# Patient Record
(demographics unavailable — no encounter records)

---

## 2024-11-14 NOTE — DISCUSSION/SUMMARY
[Full Activity without restrictions including Physical Education & Athletics] : Full Activity without restrictions including Physical Education & Athletics [] : The components of the vaccine(s) to be administered today are listed in the plan of care. The disease(s) for which the vaccine(s) are intended to prevent and the risks have been discussed with the caretaker.  The risks are also included in the appropriate vaccination information statements which have been provided to the patient's caregiver.  The caregiver has given consent to vaccinate. [FreeTextEntry1] : Adrian is a 8 y/o M with no significant PMH here for annual WCC. MOC concerned regarding patient's ability to pay attention at home and states that this year, teachers have noticed this behavior as well. Discussed difference between normal childhood behavior vs. ADHD behaviors with mother. Will give mother Farner ADHD screens for home and school, set up appointment with Dr. Burden for behavioral evaluation. No other parental concerns. Patient well appearing, exam non-focal. MOC consents to flu vaccine. Can otherwise RTC in one year for following WCC.  #Attention Difficulties - Farner screens given to MOC - Will have office set up appointment with Dr. Burden for behavioral evaluation  #Health Maintenance - Flu vaccine given - Continue balanced diet with all food groups. Brush teeth twice a day with toothbrush. Recommend visit to dentist. Help child to maintain consistent daily routines and sleep schedule. School discussed. Ensure home is safe. Teach child about personal safety. Use consistent, positive discipline. Limit screen time to no more than 2 hours per day. Encourage physical activity. Child needs to ride in a belt-positioning booster seat until  4 feet 9 inches has been reached and are between 8 and 12 years of age. - SDOH screened and scored

## 2024-11-14 NOTE — PHYSICAL EXAM
wheezes [Alert] : alert [No Acute Distress] : no acute distress [Normocephalic] : normocephalic [Conjunctivae with no discharge] : conjunctivae with no discharge [PERRL] : PERRL [EOMI Bilateral] : EOMI bilateral [Auricles Well Formed] : auricles well formed [Clear Tympanic membranes with present light reflex and bony landmarks] : clear tympanic membranes with present light reflex and bony landmarks [No Discharge] : no discharge [Nares Patent] : nares patent [Pink Nasal Mucosa] : pink nasal mucosa [Palate Intact] : palate intact [Nonerythematous Oropharynx] : nonerythematous oropharynx [Supple, full passive range of motion] : supple, full passive range of motion [No Palpable Masses] : no palpable masses [Symmetric Chest Rise] : symmetric chest rise [Clear to Auscultation Bilaterally] : clear to auscultation bilaterally [Regular Rate and Rhythm] : regular rate and rhythm [Normal S1, S2 present] : normal S1, S2 present [No Murmurs] : no murmurs [Soft] : soft [NonTender] : non tender [Non Distended] : non distended [Normoactive Bowel Sounds] : normoactive bowel sounds [No Hepatomegaly] : no hepatomegaly [No Splenomegaly] : no splenomegaly [[place]: _____] : Maldonado [unfilled] [Testicles Descended Bilaterally] : testicles descended bilaterally [Patent] : patent [No fissures] : no fissures [No Abnormal Lymph Nodes Palpated] : no abnormal lymph nodes palpated [No Gait Asymmetry] : no gait asymmetry [No pain or deformities with palpation of bone, muscles, joints] : no pain or deformities with palpation of bone, muscles, joints [Normal Muscle Tone] : normal muscle tone [Straight] : straight [Cranial Nerves Grossly Intact] : cranial nerves grossly intact [No Rash or Lesions] : no rash or lesions

## 2024-11-14 NOTE — RISK ASSESSMENT
[No family history of SCA predisposing conditions] : No family history of SCA predisposing conditions [Has anyone in your immediate family (parents, grandparents, siblings) or other more distant relatives (aunts, uncles, cousins)  of heart] : Has anyone in your immediate family (parents, grandparents, siblings) or other more distant relatives (aunts, uncles, cousins)  of heart problems or had an unexpected sudden death before age 50 (This would include unexpected drownings, unexplained car accidents in which the relative was driving or sudden infant death syndrome.)? No [Are you related to anyone with hypertrophic cardiomyopathy or hypertrophic obstructive cardiomyopathy, Marfan syndrome, arrhythmogenic] : Are you related to anyone with hypertrophic cardiomyopathy or hypertrophic obstructive cardiomyopathy, Marfan syndrome, arrhythmogenic right ventricular cardiomyopathy, long QT syndrome, short QT syndrome, Brugada syndrome or catecholaminergic polymorphic ventricular tachycardia, or anyone younger than 50 years with a pacemaker or implantable defibrillator? No

## 2024-11-14 NOTE — HISTORY OF PRESENT ILLNESS
[Mother] : mother [whole] : whole milk [Fruit] : fruit [Vegetables] : vegetables [Meat] : meat [Grains] : grains [Eggs] : eggs [Dairy] : dairy [Eats meals with family] : eats meals with family [Normal] : Normal [Brushing teeth twice/d] : brushing teeth twice per day [Yes] : Patient goes to dentist yearly [Playtime (60 min/d)] : playtime 60 min a day [Appropiate parent-child-sibling interaction] : appropriate parent-child-sibling interaction [Has Friends] : has friends [Grade ___] : Grade [unfilled] [No] : No cigarette smoke exposure [Appropriately restrained in motor vehicle] : appropriately restrained in motor vehicle [Supervised outdoor play] : supervised outdoor play [Parent knows child's friends] : parent knows child's friends [Up to date] : Up to date [NO] : No [Exposure to electronic nicotine delivery system] : No exposure to electronic nicotine delivery system [FreeTextEntry7] : No significant interval history [de-identified] : MOC concerned regarding patient's attention. States that she had concern last year about inability to sit still but teachers at that time were not concerned. Says that this year, his fidgeting has worsened, he has a lot of energy, and teachers have also commented on it at recent parent-teacher conference after MOC brought up the subect. No other parental concerns. [FreeTextEntry9] : +plays soccer, considering starting karate this winter [de-identified] : See parental concern above [de-identified] : [facility] vaccine

## 2025-02-05 NOTE — REVIEW OF SYSTEMS
[Fever] : fever [Nasal Discharge] : nasal discharge [Nasal Congestion] : nasal congestion [Diarrhea] : diarrhea [Negative] : Genitourinary

## 2025-02-05 NOTE — HISTORY OF PRESENT ILLNESS
[FreeTextEntry6] : fever 3 days ago x2 days, then afebrile for one day, last night fever spiked to 102 diarrhea x1 episode yesterday denies vomiting eating and drinking at baseline active and playful

## 2025-02-05 NOTE — DISCUSSION/SUMMARY
[FreeTextEntry1] : 8 YO here for URI Supportive care discussed with MOC such as increasing fluids, monitor temp and administer Tylenol/Motrin PRN, advised to monitor UOP, if no UOP within 8 hours go to ED, encourage clear liquids, encourage pt. to cough in order to clear chest congestion, steam bathroom inhalation along with chest PT, NS nasal spray, cool mist humidifier use, monitor for any changes of symptoms, verbal understanding received Reviewed ED precautions s/s of SOB, retractions, and labored breathing, verbal understanding received RTC for WCC/PRN

## 2025-04-01 NOTE — PHYSICAL EXAM
[Clear] : right tympanic membrane clear [Clear Rhinorrhea] : clear rhinorrhea [No Abnormal Lymph Nodes Palpated] : no abnormal lymph nodes palpated [NL] : warm, clear [Mucoid Discharge] : no mucoid discharge [Nasal Flaring] : no nasal flaring [Bleeding] : no bleeding [FreeTextEntry4] : +audible nasal congestion

## 2025-04-01 NOTE — END OF VISIT
[] : Resident [FreeTextEntry3] : I reviewed the history of patient & discussed with resident. Agree with assessment & management plan as documented in residents note and addendums made as needed above.  Dilma Diaz MD Chief Resident

## 2025-04-01 NOTE — DISCUSSION/SUMMARY
[FreeTextEntry1] : 6yo M with PMH of sickle cell trait presents with nasal congestion x1 week. VS reviewed. Afebrile. Well-appearing on physical exam with clear lungs, clear TMs, no oropharyngeal erythema. Likely acute URI given sister also has congestion and fever.    #Acute URI  - Cool-mist humidifier  - Encourage fluids  - RTC if having new fevers, new cough or ear pain  - Seek emergency medical care if having any difficulty breathing  - Return precautions discussed with mother

## 2025-04-01 NOTE — HISTORY OF PRESENT ILLNESS
[EENT/Resp Symptoms] : EENT/RESPIRATORY SYMPTOMS [___ Week(s)] : [unfilled] week(s) [Intermittent] : intermittent [Max Temp: ____] : Max temperature: [unfilled] [de-identified] : nasal congestion  [FreeTextEntry6] : 8yo M with PMH of sickle cell trait presents with nasal congestion x1 week. No fevers. No recent travel. Sister developed fever with nasal congestion 4 days ago. He has been eating, drinking, and voiding as usual. No vomiting, diarrhea, or rash. No ear or throat pain. Mom reports minimal coughing over the past week that is now resolving.